# Patient Record
Sex: MALE | Race: WHITE | NOT HISPANIC OR LATINO | URBAN - METROPOLITAN AREA
[De-identification: names, ages, dates, MRNs, and addresses within clinical notes are randomized per-mention and may not be internally consistent; named-entity substitution may affect disease eponyms.]

---

## 2021-01-28 ENCOUNTER — OFFICE VISIT (OUTPATIENT)
Dept: ORTHOPEDIC SURGERY | Facility: CLINIC | Age: 61
End: 2021-01-28

## 2021-01-28 VITALS — BODY MASS INDEX: 18.88 KG/M2 | HEIGHT: 76 IN | WEIGHT: 155 LBS | TEMPERATURE: 98.6 F

## 2021-01-28 DIAGNOSIS — M75.101 TEAR OF RIGHT ROTATOR CUFF, UNSPECIFIED TEAR EXTENT, UNSPECIFIED WHETHER TRAUMATIC: Primary | ICD-10-CM

## 2021-01-28 DIAGNOSIS — R52 PAIN: ICD-10-CM

## 2021-01-28 PROCEDURE — 99204 OFFICE O/P NEW MOD 45 MIN: CPT | Performed by: ORTHOPAEDIC SURGERY

## 2021-01-28 PROCEDURE — 20610 DRAIN/INJ JOINT/BURSA W/O US: CPT | Performed by: ORTHOPAEDIC SURGERY

## 2021-01-28 PROCEDURE — 73030 X-RAY EXAM OF SHOULDER: CPT | Performed by: ORTHOPAEDIC SURGERY

## 2021-01-28 RX ORDER — BUPRENORPHINE HYDROCHLORIDE AND NALOXONE HYDROCHLORIDE DIHYDRATE 8; 2 MG/1; MG/1
1 TABLET SUBLINGUAL DAILY
COMMUNITY

## 2021-01-28 RX ORDER — ALBUTEROL SULFATE 2.5 MG/3ML
SOLUTION RESPIRATORY (INHALATION)
COMMUNITY
Start: 2020-12-10

## 2021-01-28 RX ORDER — TRAZODONE HYDROCHLORIDE 100 MG/1
TABLET ORAL
COMMUNITY
Start: 2021-01-13

## 2021-01-28 RX ORDER — TIOTROPIUM BROMIDE AND OLODATEROL 3.124; 2.736 UG/1; UG/1
SPRAY, METERED RESPIRATORY (INHALATION)
COMMUNITY
Start: 2021-01-13

## 2021-01-28 RX ADMIN — METHYLPREDNISOLONE ACETATE 80 MG: 80 INJECTION, SUSPENSION INTRA-ARTICULAR; INTRALESIONAL; INTRAMUSCULAR; SOFT TISSUE at 08:38

## 2021-02-08 RX ORDER — METHYLPREDNISOLONE ACETATE 80 MG/ML
80 INJECTION, SUSPENSION INTRA-ARTICULAR; INTRALESIONAL; INTRAMUSCULAR; SOFT TISSUE
Status: COMPLETED | OUTPATIENT
Start: 2021-01-28 | End: 2021-01-28

## 2021-02-26 ENCOUNTER — OFFICE VISIT (OUTPATIENT)
Dept: ORTHOPEDIC SURGERY | Facility: CLINIC | Age: 61
End: 2021-02-26

## 2021-02-26 VITALS — TEMPERATURE: 97.3 F | BODY MASS INDEX: 18.88 KG/M2 | WEIGHT: 155 LBS | HEIGHT: 76 IN

## 2021-02-26 DIAGNOSIS — M75.101 TEAR OF RIGHT ROTATOR CUFF, UNSPECIFIED TEAR EXTENT, UNSPECIFIED WHETHER TRAUMATIC: Primary | ICD-10-CM

## 2021-02-26 PROCEDURE — 99212 OFFICE O/P EST SF 10 MIN: CPT | Performed by: ORTHOPAEDIC SURGERY

## 2021-02-26 NOTE — PROGRESS NOTES
Shoulder Follow Up      Patient: Tien Jarquin        YOB: 1960            Chief Complaints: right Shoulder pain      History of Present Illness: Patient is here follow right shoulder pain I injected him last visit he states he got some relief about 50% but still has pain particularly in some positions in particular with overhead activity      Physical Exam: 60 y.o. male  General Appearance:    Alert, cooperative, in no acute distress                 There were no vitals filed for this visit.     Patient is alert and read ×3 no acute distress appears her above-listed at height weight and age.  Affect is normal respiratory rate is normal unlabored. Heart rate regular rate rhythm, sclera, dentition and hearing are normal for the purpose of this exam.      Ortho Exam    Physical exam of the right shoulder reveals no overlying skin changes no lymphedema no lymphadenopathy.  Patient has active flexion 150 with mild symptoms passively I can get him to 180 abduction is similar external rotation is to 50 and internal rotation to the upper lumbar spine with mild symptoms.  Patient has good rotator cuff strength 4+ over 5 with isometric strength testing with pain.  Patient has a positive impingement and a positive Cardoza sign.  Patient has good cervical range of motion which is full and asymptomatic no radicular symptoms.  Patient has a normal elbow exam.  Good distal pulses are present  Patient has pain with overhead activity and a positive Neer sign and a positive empty can sign , a positive drop arm and a definitive painful arc        Assessment/Plan:      Persistent right shoulder pain despite conservative measures I suspect he does have a rotator cuff tear which I did discuss with him plan is to proceed with an MRI to better assess this and he will follow-up after that test

## 2021-04-21 ENCOUNTER — HOSPITAL ENCOUNTER (OUTPATIENT)
Dept: MRI IMAGING | Facility: HOSPITAL | Age: 61
Discharge: HOME | End: 2021-04-21
Admitting: ORTHOPAEDIC SURGERY

## 2021-04-21 DIAGNOSIS — M75.101 TEAR OF RIGHT ROTATOR CUFF, UNSPECIFIED TEAR EXTENT, UNSPECIFIED WHETHER TRAUMATIC: ICD-10-CM

## 2021-04-21 PROCEDURE — 73221 MRI JOINT UPR EXTREM W/O DYE: CPT

## 2021-05-03 ENCOUNTER — OFFICE VISIT (OUTPATIENT)
Dept: ORTHOPEDIC SURGERY | Facility: CLINIC | Age: 61
End: 2021-05-03

## 2021-05-03 VITALS — HEIGHT: 76 IN | TEMPERATURE: 98 F | WEIGHT: 155 LBS | BODY MASS INDEX: 18.88 KG/M2

## 2021-05-03 DIAGNOSIS — M75.111 INCOMPLETE TEAR OF RIGHT ROTATOR CUFF, UNSPECIFIED WHETHER TRAUMATIC: Primary | ICD-10-CM

## 2021-05-03 PROCEDURE — 99213 OFFICE O/P EST LOW 20 MIN: CPT | Performed by: ORTHOPAEDIC SURGERY

## 2021-05-03 PROCEDURE — 20610 DRAIN/INJ JOINT/BURSA W/O US: CPT | Performed by: ORTHOPAEDIC SURGERY

## 2021-05-03 RX ADMIN — LIDOCAINE HYDROCHLORIDE 4 ML: 20 INJECTION, SOLUTION EPIDURAL; INFILTRATION; INTRACAUDAL; PERINEURAL at 15:56

## 2021-05-03 RX ADMIN — METHYLPREDNISOLONE ACETATE 80 MG: 80 INJECTION, SUSPENSION INTRA-ARTICULAR; INTRALESIONAL; INTRAMUSCULAR; SOFT TISSUE at 15:56

## 2021-05-03 NOTE — PROGRESS NOTES
Right Shoulder MRI Follow Up      Patient: Tien Jarquin        YOB: 1960            Chief Complaints: right Shoulder pain      History of Present Illness: The patient is here follow-up of an MRI of the shoulder MRI demonstrates a partial tear of the rotator cuff as well as a tear of the posterior superior labrum we did a subacromial injection he got temporary but minimal relief.  Talked him about another injection at this point time he is cannot considering surgical we will try glenohumeral injection and continue physical therapy      Physical Exam: 60 y.o. male  General Appearance:    Alert, cooperative, in no acute distress                 There were no vitals filed for this visit.     Patient is alert and read ×3 no acute distress appears her above-listed at height weight and age.  Affect is normal respiratory rate is normal unlabored. Heart rate regular rate rhythm, sclera, dentition and hearing are normal for the purpose of this exam.      Ortho Exam  Physical exam of the right shoulder reveals no overlying skin changes no lymphedema no lymphadenopathy.  Patient has active flexion 180 with mild symptoms abduction is similar external rotation is to 50 and internal rotation to the upper lumbar spine with mild symptoms.  Patient has good rotator cuff strength 4+ over 5 with isometric strength testing with pain.  Patient has a positive impingement and a positive Cardoza sign.  Patient has good cervical range of motion which is full and asymptomatic no radicular symptoms.  Patient has a normal elbow exam.  Good distal pulses are present  Patient has pain with overhead activity and a positive Neer sign and a positive empty can sign , a positive drop arm and a definitive painful arc    MRI Results: MRIs as above I reviewed the films myself and agree with the findings  Large Joint Arthrocentesis: R glenohumeral  Date/Time: 5/3/2021 3:56 PM  Consent given by: patient  Site marked: site  marked  Timeout: Immediately prior to procedure a time out was called to verify the correct patient, procedure, equipment, support staff and site/side marked as required   Supporting Documentation  Indications: pain   Procedure Details  Location: shoulder - R glenohumeral  Preparation: Patient was prepped and draped in the usual sterile fashion  Needle size: 22 G  Approach: posterior  Medications administered: 80 mg methylPREDNISolone acetate 80 MG/ML; 4 mL lidocaine PF 2% 2 %  Patient tolerance: patient tolerated the procedure well with no immediate complications            Assessment/Plan:    Right shoulder pain with partial rotator cuff tear some changes in the labrum I want to try glenohumeral injection and have him see physical therapy to work on range of motion and strengthening I will see him back in 4 weeks we did discuss possibility of surgical intervention in the future if these conservative measures fail we also discussed what that would entail as far as time off etc.

## 2021-05-04 RX ORDER — LIDOCAINE HYDROCHLORIDE 20 MG/ML
4 INJECTION, SOLUTION EPIDURAL; INFILTRATION; INTRACAUDAL; PERINEURAL
Status: COMPLETED | OUTPATIENT
Start: 2021-05-03 | End: 2021-05-03

## 2021-05-04 RX ORDER — METHYLPREDNISOLONE ACETATE 80 MG/ML
80 INJECTION, SUSPENSION INTRA-ARTICULAR; INTRALESIONAL; INTRAMUSCULAR; SOFT TISSUE
Status: COMPLETED | OUTPATIENT
Start: 2021-05-03 | End: 2021-05-03

## 2021-08-02 NOTE — PROGRESS NOTES
Patient: Tien Jarquin  YOB: 1960  Date of Service: 8/2/2021    Chief Complaints: Right shoulder pain  Subjective:    History of Present Illness: Pt is seen in the office today with complaints of right shoulder pain we did do an MRI which showed a partial rotator cuff tear as well is a tear of the labrum.  We tried a subacromial injection he did not get any relief I saw him.  In May we did a glenohumeral injection he got great relief.  We talked about options including surgical options he would really just like to do another injection he is trying to behave and not do a lot of overhead lifting        Allergies: No Known Allergies    Medications:   Home Medications:  Current Outpatient Medications on File Prior to Visit   Medication Sig   • albuterol (PROVENTIL) (2.5 MG/3ML) 0.083% nebulizer solution    • buprenorphine-naloxone (SUBOXONE) 8-2 MG per SL tablet Place 1 tablet under the tongue Daily.   • Stiolto Respimat 2.5-2.5 MCG/ACT aerosol solution inhaler    • traZODone (DESYREL) 100 MG tablet      No current facility-administered medications on file prior to visit.     Current Medications:  Scheduled Meds:  Continuous Infusions:No current facility-administered medications for this visit.    PRN Meds:.    I have reviewed the patient's medical history in detail and updated the computerized patient record.  Review and summarization of old records include:    Past Medical History:   Diagnosis Date   • Back problem    • Emphysema lung (CMS/HCC)    • Epilepsy (CMS/HCC)       No past surgical history on file.     Social History     Occupational History   • Not on file   Tobacco Use   • Smoking status: Current Every Day Smoker     Packs/day: 1.00     Years: 50.00     Pack years: 50.00     Types: Cigarettes   • Smokeless tobacco: Current User   Vaping Use   • Vaping Use: Never used   Substance and Sexual Activity   • Alcohol use: Yes     Alcohol/week: 4.0 standard drinks     Types: 4 Cans of beer per  week   • Drug use: Not on file   • Sexual activity: Not on file      Social History     Social History Narrative   • Not on file      No family history on file.    ROS: 14 point review of systems was performed and was negative except for documented findings in HPI and today's encounter.     Allergies: No Known Allergies  Constitutional:  Denies fever, shaking or chills   Eyes:  Denies change in visual acuity   HENT:  Denies nasal congestion or sore throat   Respiratory:  Denies cough or shortness of breath   Cardiovascular:  Denies chest pain or severe LE edema   GI:  Denies abdominal pain, nausea, vomiting, bloody stools or diarrhea   Musculoskeletal:  Numbness, tingling, or loss of motor function only as noted above in history of present illness.  : Denies painful urination or hematuria  Integument:  Denies rash, lesion or ulceration   Neurologic:  Denies headache or focal weakness  Endocrine:  Denies lymphadenopathy  Psych:  Denies confusion or change in mental status   Hem:  Denies active bleeding      Physical Exam: 61 y.o. male  Wt Readings from Last 3 Encounters:   05/03/21 70.3 kg (155 lb)   02/26/21 70.3 kg (155 lb)   01/28/21 70.3 kg (155 lb)       There is no height or weight on file to calculate BMI.  No height and weight on file for this encounter.  There were no vitals filed for this visit.  Vital signs reviewed.   General Appearance:    Alert, cooperative, in no acute distress                    Ortho exam      Exam is unchanged       .time    Assessment: Right shoulder pain with labral pathology and a partial rotator cuff tear he did great with a glenohumeral injection he would like to do that again today.  He understands the surgical options as long as he wants to continue conservative eating no surgery and he would like an injection he will see Duane in follow-up    Plan:   Follow up as indicated.  Ice, elevate, and rest as needed.  Discussed conservative measures of pain control including ice,  bracing.  Also talked about the importance of strengthening and maintaining ideal body weight    Lara Onofre M.D.    Large Joint Arthrocentesis: R glenohumeral  Date/Time: 8/5/2021 7:55 AM  Consent given by: patient  Site marked: site marked  Timeout: Immediately prior to procedure a time out was called to verify the correct patient, procedure, equipment, support staff and site/side marked as required   Supporting Documentation  Indications: pain   Procedure Details  Location: shoulder - R glenohumeral  Preparation: Patient was prepped and draped in the usual sterile fashion  Needle gauge: 21G.  Approach: anterior  Medications administered: 80 mg methylPREDNISolone acetate 80 MG/ML; 4 mL lidocaine PF 1% 1 %  Patient tolerance: patient tolerated the procedure well with no immediate complications

## 2021-08-05 ENCOUNTER — OFFICE VISIT (OUTPATIENT)
Dept: ORTHOPEDIC SURGERY | Facility: CLINIC | Age: 61
End: 2021-08-05

## 2021-08-05 VITALS — WEIGHT: 165 LBS | BODY MASS INDEX: 20.09 KG/M2 | HEIGHT: 76 IN | TEMPERATURE: 96.4 F

## 2021-08-05 DIAGNOSIS — S43.431D TEAR OF RIGHT GLENOID LABRUM, SUBSEQUENT ENCOUNTER: ICD-10-CM

## 2021-08-05 DIAGNOSIS — M75.111 INCOMPLETE TEAR OF RIGHT ROTATOR CUFF, UNSPECIFIED WHETHER TRAUMATIC: Primary | ICD-10-CM

## 2021-08-05 PROCEDURE — 20610 DRAIN/INJ JOINT/BURSA W/O US: CPT | Performed by: ORTHOPAEDIC SURGERY

## 2021-08-05 RX ORDER — LIDOCAINE HYDROCHLORIDE 10 MG/ML
4 INJECTION, SOLUTION EPIDURAL; INFILTRATION; INTRACAUDAL; PERINEURAL
Status: COMPLETED | OUTPATIENT
Start: 2021-08-05 | End: 2021-08-05

## 2021-08-05 RX ORDER — METHYLPREDNISOLONE ACETATE 80 MG/ML
80 INJECTION, SUSPENSION INTRA-ARTICULAR; INTRALESIONAL; INTRAMUSCULAR; SOFT TISSUE
Status: COMPLETED | OUTPATIENT
Start: 2021-08-05 | End: 2021-08-05

## 2021-08-05 RX ADMIN — LIDOCAINE HYDROCHLORIDE 4 ML: 10 INJECTION, SOLUTION EPIDURAL; INFILTRATION; INTRACAUDAL; PERINEURAL at 07:55

## 2021-08-05 RX ADMIN — METHYLPREDNISOLONE ACETATE 80 MG: 80 INJECTION, SUSPENSION INTRA-ARTICULAR; INTRALESIONAL; INTRAMUSCULAR; SOFT TISSUE at 07:55

## 2022-01-18 ENCOUNTER — TELEPHONE (OUTPATIENT)
Dept: ORTHOPEDIC SURGERY | Facility: CLINIC | Age: 62
End: 2022-01-18

## 2022-01-18 NOTE — TELEPHONE ENCOUNTER
Caller: MARQUISE FITCH    Relationship to patient: SELF    Best call back number:  [phone]    Chief complaint: PATIENT NEEDS APPT. FOR F/U RIGHT SHOULDER CORTISONE INJECTION. PATIENT SAYS DIMITRY TOLD HIM THAT HER NURSE COULD DO IT. PATIENT WOULD LIKE THE [place] OFFICE.    Type of visit: INJECTION    Requested date: ASAP

## 2022-01-26 NOTE — PROGRESS NOTES
[facility]  Follow Up Visit      Patient Name: Tien Jarquin  : 1960  Primary Care Physician: Provider, No Known        Chief Complaint: Right shoulder pain    HPI:   Tien Jarquin is a 61 y.o. year old who presents today for right shoulder pain.  Patient has a history of chronic right shoulder pain, he has been evaluated and treated by Dr. Onofre in the past.  He has an incomplete tear of the right rotator cuff as well as tear of the labrum.  He knows his surgical options and is not interested in any surgery at this time.  He has received intermittent injections with good relief in his symptoms.  He still able to do all the things he likes doing including golfing, fishing and hunting.  He says the shoulder did pretty well last time, he has not been in for an injection in close to 6 months.  He wishes to proceed with an additional injection today and has new x-rays for further evaluation and treatment.    ROS:  ROS negative except as listed in the HPI.     Allergies: No Known Allergies    Medications:  Current Outpatient Medications on File Prior to Visit   Medication Sig   • albuterol (PROVENTIL) (2.5 MG/3ML) 0.083% nebulizer solution    • buprenorphine-naloxone (SUBOXONE) 8-2 MG per SL tablet Place 1 tablet under the tongue Daily.   • Stiolto Respimat 2.5-2.5 MCG/ACT aerosol solution inhaler    • traZODone (DESYREL) 100 MG tablet      No current facility-administered medications on file prior to visit.       Physical Exam:   61 y.o. male  Body mass index is 20.08 kg/m²., 74.8 kg (165 lb)  Vitals:    22 0921   Temp: 97 °F (36.1 °C)     General: Alert, cooperative, appears well and in no observable distress. Appears stated age and BMI as listed above.  Respiratory: Normal respiratory effort.  Skin: Warm & well perfused; appropriate skin turgor.  Psych: Appropriate mood & affect.    MSK Exam:  Physical exam of the right shoulder reveals no deformity or wounds, no significant swelling  redness or effusion.     Radiology:    The following X-rays were ordered/reviewed today to evaluate the patient's symptoms: Shoulder: AP, Scapular Y and Axillary Lateral of right shoulder(s) show Moderate AC joint degenerative changes.  Very questionable high riding humeral head but overall still well-seated in the glenoid.  He maintains adequate glenohumeral space.  Overall I do not appreciate any significant progression in his degenerative changes when compared with images from 1 year ago..    Procedure:   See Procedure Note: The potential risks and benefits of performing a diagnostic and therapeutic injection were discussed with the patient prior to procedure. Risks include, but are not limited to infection, swelling, transient increase in pain, bleeding, bruising. Patient was advised that injections are a diagnostic and therapeutic tool meaning they may not alleviate symptoms at all, or may only provide partial or temporary relief.       Misc. Data/Labs: N/A    Assessment & Plan:    This is a 61-year-old male with chronic right shoulder pain.  He has an MRI which demonstrates partial rotator cuff tear as well as a labral tear.  Is not interested in anything surgical at this time.  Plan is to proceed with a glenohumeral injection today which she tolerated well.  I will see him back as needed, he could certainly come in soon as 3 months but we talked about spacing that out is much as possible, if he is not in pain I would hold off on unnecessary injections.  Continue activities as tolerated.    Injection provided in the office today. Tolerated well with no immediate complications. Injection precautions discussed with the patient., Follow up PRN. and Patient encouraged to call with questions or concerns prior to follow up.       ICD-10-CM ICD-9-CM   1. Tear of right glenoid labrum, subsequent encounter  S43.431D V58.89     840.8   2. Incomplete tear of right rotator cuff, unspecified whether traumatic  M75.111  840.4     No orders of the defined types were placed in this encounter.    Orders Placed This Encounter   Procedures   • Large Joint Arthrocentesis: R glenohumeral     Return in about 3 months (around 4/27/2022), or if symptoms worsen or fail to improve.    NATHANIEL Wayne  Large Joint Arthrocentesis: R glenohumeral  Date/Time: 1/27/2022 9:24 AM  Consent given by: patient  Site marked: site marked  Timeout: Immediately prior to procedure a time out was called to verify the correct patient, procedure, equipment, support staff and site/side marked as required   Supporting Documentation  Indications: pain   Procedure Details  Location: shoulder - R glenohumeral  Preparation: Patient was prepped and draped in the usual sterile fashion  Needle gauge: 21G.  Approach: posterior  Medications administered: 4 mL lidocaine (cardiac); 40 mg triamcinolone acetonide 40 MG/ML  Patient tolerance: patient tolerated the procedure well with no immediate complications            Dictated Utilizing Dragon Dictation

## 2022-01-27 ENCOUNTER — CLINICAL SUPPORT (OUTPATIENT)
Dept: ORTHOPEDIC SURGERY | Facility: CLINIC | Age: 62
End: 2022-01-27

## 2022-01-27 VITALS — WEIGHT: 165 LBS | BODY MASS INDEX: 20.09 KG/M2 | HEIGHT: 76 IN | TEMPERATURE: 97 F

## 2022-01-27 DIAGNOSIS — S43.431D TEAR OF RIGHT GLENOID LABRUM, SUBSEQUENT ENCOUNTER: Primary | ICD-10-CM

## 2022-01-27 DIAGNOSIS — M75.111 INCOMPLETE TEAR OF RIGHT ROTATOR CUFF, UNSPECIFIED WHETHER TRAUMATIC: ICD-10-CM

## 2022-01-27 PROCEDURE — 73030 X-RAY EXAM OF SHOULDER: CPT | Performed by: NURSE PRACTITIONER

## 2022-01-27 PROCEDURE — 20610 DRAIN/INJ JOINT/BURSA W/O US: CPT | Performed by: NURSE PRACTITIONER

## 2022-01-27 RX ORDER — TRIAMCINOLONE ACETONIDE 40 MG/ML
40 INJECTION, SUSPENSION INTRA-ARTICULAR; INTRAMUSCULAR
Status: COMPLETED | OUTPATIENT
Start: 2022-01-27 | End: 2022-01-27

## 2022-01-27 RX ADMIN — TRIAMCINOLONE ACETONIDE 40 MG: 40 INJECTION, SUSPENSION INTRA-ARTICULAR; INTRAMUSCULAR at 09:24

## 2022-07-27 ENCOUNTER — TELEPHONE (OUTPATIENT)
Dept: ORTHOPEDIC SURGERY | Facility: CLINIC | Age: 62
End: 2022-07-27

## 2022-07-27 NOTE — TELEPHONE ENCOUNTER
Caller: PATIENT    Relationship to patient: SELF     Best call back number: [phone]    Chief complaint: RIGHT SHOULDER PAIN    Type of visit: CORTISONE INJECTION     Requested date:     Additional notes: PT. WANTS TO SCHEDULE CORTISONE INJECTION FOR RIGHT SHOULDER.

## 2022-08-03 NOTE — PROGRESS NOTES
[facility]  Follow Up Visit      Patient Name: Tien Jarquin  : 1960  Primary Care Physician: Provider, No Known        Chief Complaint: Right shoulder pain    HPI:   Tien Jarquin is a 62 y.o. year old who presents today for right shoulder pain.  Patient has a history of chronic right shoulder pain and has been evaluated the past by Dr. 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  He has been explained to surgical options in the past and is not interested in surgery.  He does well with conservative measures, we last saw him approximately 6 months ago.  He is noticed recently his pain started to return.    ROS:  ROS negative except as listed in the HPI.    Allergies: No Known Allergies    Medications:  Current Outpatient Medications on File Prior to Visit   Medication Sig   • albuterol (PROVENTIL) (2.5 MG/3ML) 0.083% nebulizer solution    • buprenorphine-naloxone (SUBOXONE) 8-2 MG per SL tablet Place 1 tablet under the tongue Daily.   • magnesium oxide (MAG-OX) 400 MG tablet Take 400 mg by mouth Daily.   • Stiolto Respimat 2.5-2.5 MCG/ACT aerosol solution inhaler    • traZODone (DESYREL) 100 MG tablet      No current facility-administered medications on file prior to visit.       Physical Exam:   62 y.o. male  Body mass index is 20.71 kg/m²., 77.2 kg (170 lb 1.6 oz)  Vitals:    22 1501   Temp: 98.6 °F (37 °C)     General: Alert, cooperative, appears well and in no observable distress. Appears stated age and BMI as listed above.  Respiratory: Normal respiratory effort.  Skin: Warm & well perfused; appropriate skin turgor.  Psych: Appropriate mood & affect.    MSK Exam:  Exam is grossly unchanged, no deformities or wounds, no redness swelling or warmth.     Radiology:    No new images were needed at the visit today.     Procedure:   See Procedure Note: The potential risks and benefits of performing a diagnostic and therapeutic injection were  discussed with the patient prior to procedure. Risks include, but are not limited to infection, swelling, transient increase in pain, bleeding, bruising. Patient was advised that injections are a diagnostic and therapeutic tool meaning they may not alleviate symptoms at all, or may only provide partial or temporary relief. Injection precautions and aftercare discussed.    Large Joint Arthrocentesis: R glenohumeral  Date/Time: 8/4/2022 3:56 PM  Consent given by: patient  Site marked: site marked  Timeout: Immediately prior to procedure a time out was called to verify the correct patient, procedure, equipment, support staff and site/side marked as required   Supporting Documentation  Indications: pain   Procedure Details  Location: shoulder - R glenohumeral  Preparation: Patient was prepped and draped in the usual sterile fashion  Needle gauge: 21G.  Approach: posterior  Medications administered: 80 mg methylPREDNISolone acetate 80 MG/ML; 4 mL lidocaine PF 1% 1 %  Patient tolerance: patient tolerated the procedure well with no immediate complications          Misc. Data/Labs: N/A    Assessment & Plan:    This is a 62-year-old male with chronic right shoulder pain, rotator cuff pathology as well as labral pathology.  He gets intermittent injections with good relief in his symptoms is not interested in anything surgical.  He knows how to modify his activities.  Plan is to proceed with injection we will see him back as needed he knows to call with any questions or concerns prior to follow-up.          ICD-10-CM ICD-9-CM   1. Incomplete tear of right rotator cuff, unspecified whether traumatic  M75.111 840.4   2. Tear of right glenoid labrum, subsequent encounter  S43.431D V58.89     840.8   3. Chronic right shoulder pain  M25.511 719.41    G89.29 338.29     No orders of the defined types were placed in this encounter.    No orders of the defined types were placed in this encounter.    Return if symptoms worsen or fail to  improve.    Patient encouraged to call with questions or concerns prior to follow up.  Recommend ICE and/or HEAT PRN as discussed.  Will discuss with attending physician as needed.  Consider additional referrals, work up and/or advanced imaging as indicated or if patient fails to respond to conservative care.        Duane Sheppard, NATHANIEL      Dictated Utilizing Dragon Dictation

## 2022-08-04 ENCOUNTER — CLINICAL SUPPORT (OUTPATIENT)
Dept: ORTHOPEDIC SURGERY | Facility: CLINIC | Age: 62
End: 2022-08-04

## 2022-08-04 VITALS — WEIGHT: 170.1 LBS | BODY MASS INDEX: 20.71 KG/M2 | TEMPERATURE: 98.6 F | HEIGHT: 76 IN

## 2022-08-04 DIAGNOSIS — M75.111 INCOMPLETE TEAR OF RIGHT ROTATOR CUFF, UNSPECIFIED WHETHER TRAUMATIC: ICD-10-CM

## 2022-08-04 DIAGNOSIS — S43.431D TEAR OF RIGHT GLENOID LABRUM, SUBSEQUENT ENCOUNTER: Primary | ICD-10-CM

## 2022-08-04 DIAGNOSIS — G89.29 CHRONIC RIGHT SHOULDER PAIN: ICD-10-CM

## 2022-08-04 DIAGNOSIS — M25.511 CHRONIC RIGHT SHOULDER PAIN: ICD-10-CM

## 2022-08-04 PROCEDURE — 20610 DRAIN/INJ JOINT/BURSA W/O US: CPT | Performed by: NURSE PRACTITIONER

## 2022-08-04 RX ORDER — LIDOCAINE HYDROCHLORIDE 10 MG/ML
4 INJECTION, SOLUTION EPIDURAL; INFILTRATION; INTRACAUDAL; PERINEURAL
Status: COMPLETED | OUTPATIENT
Start: 2022-08-04 | End: 2022-08-04

## 2022-08-04 RX ORDER — METHYLPREDNISOLONE ACETATE 80 MG/ML
80 INJECTION, SUSPENSION INTRA-ARTICULAR; INTRALESIONAL; INTRAMUSCULAR; SOFT TISSUE
Status: COMPLETED | OUTPATIENT
Start: 2022-08-04 | End: 2022-08-04

## 2022-08-04 RX ORDER — MAGNESIUM OXIDE 400 MG/1
400 TABLET ORAL DAILY
COMMUNITY

## 2022-08-04 RX ADMIN — LIDOCAINE HYDROCHLORIDE 4 ML: 10 INJECTION, SOLUTION EPIDURAL; INFILTRATION; INTRACAUDAL; PERINEURAL at 15:56

## 2022-08-04 RX ADMIN — METHYLPREDNISOLONE ACETATE 80 MG: 80 INJECTION, SUSPENSION INTRA-ARTICULAR; INTRALESIONAL; INTRAMUSCULAR; SOFT TISSUE at 15:56

## 2023-04-27 ENCOUNTER — TELEPHONE (OUTPATIENT)
Dept: ORTHOPEDIC SURGERY | Facility: CLINIC | Age: 63
End: 2023-04-27

## 2023-04-27 NOTE — TELEPHONE ENCOUNTER
Caller: NATHANIEL MARIEE    Relationship to patient: PATIENT     Best call back number: [phone]    Chief complaint: RIGHT SHOULDER     Type of visit: INJECTION     Requested date: ASAP     If rescheduling, when is the original appointment: N/A

## 2023-04-28 ENCOUNTER — CLINICAL SUPPORT (OUTPATIENT)
Dept: ORTHOPEDIC SURGERY | Facility: CLINIC | Age: 63
End: 2023-04-28
Payer: MEDICAID

## 2023-04-28 VITALS — HEIGHT: 76 IN | BODY MASS INDEX: 20.58 KG/M2 | WEIGHT: 169 LBS | TEMPERATURE: 97.6 F

## 2023-04-28 DIAGNOSIS — S43.431D TEAR OF RIGHT GLENOID LABRUM, SUBSEQUENT ENCOUNTER: ICD-10-CM

## 2023-04-28 DIAGNOSIS — G89.29 CHRONIC RIGHT SHOULDER PAIN: Primary | ICD-10-CM

## 2023-04-28 DIAGNOSIS — M75.111 INCOMPLETE TEAR OF RIGHT ROTATOR CUFF, UNSPECIFIED WHETHER TRAUMATIC: ICD-10-CM

## 2023-04-28 DIAGNOSIS — M25.511 CHRONIC RIGHT SHOULDER PAIN: Primary | ICD-10-CM

## 2023-04-28 RX ORDER — LIDOCAINE HYDROCHLORIDE 10 MG/ML
2 INJECTION, SOLUTION EPIDURAL; INFILTRATION; INTRACAUDAL; PERINEURAL
Status: COMPLETED | OUTPATIENT
Start: 2023-04-28 | End: 2023-04-28

## 2023-04-28 RX ORDER — METHYLPREDNISOLONE ACETATE 80 MG/ML
80 INJECTION, SUSPENSION INTRA-ARTICULAR; INTRALESIONAL; INTRAMUSCULAR; SOFT TISSUE
Status: COMPLETED | OUTPATIENT
Start: 2023-04-28 | End: 2023-04-28

## 2023-04-28 RX ORDER — ALBUTEROL SULFATE 90 UG/1
AEROSOL, METERED RESPIRATORY (INHALATION)
COMMUNITY
Start: 2023-01-30

## 2023-04-28 RX ADMIN — METHYLPREDNISOLONE ACETATE 80 MG: 80 INJECTION, SUSPENSION INTRA-ARTICULAR; INTRALESIONAL; INTRAMUSCULAR; SOFT TISSUE at 09:16

## 2023-04-28 RX ADMIN — LIDOCAINE HYDROCHLORIDE 2 ML: 10 INJECTION, SOLUTION EPIDURAL; INFILTRATION; INTRACAUDAL; PERINEURAL at 09:16

## 2023-04-28 NOTE — PROGRESS NOTES
[facility]  Follow Up Visit      Patient Name: Tien Jarquin  : 1960  Primary Care Physician: Provider, No Known        Chief Complaint: Right shoulder pain     HPI:   Tien Jarqiun is a 62 y.o. year old who presents today for right shoulder pain. Patient has a history of chronic right shoulder pain and has been evaluated the past by Dr. 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  He has been explained to surgical options in the past and is not interested in surgery.  He does well with conservative measures, we last saw him approximately 8 months ago.  He is noticed recently his pain started to return. He recently retired which has helped. He has learned to modify his activities as well. He is here today with new x-rays for further evaluation and treatment.     ROS:  ROS negative except as listed in the HPI.    Allergies: No Known Allergies    Medications:  Current Outpatient Medications on File Prior to Visit   Medication Sig   • albuterol (PROVENTIL) (2.5 MG/3ML) 0.083% nebulizer solution    • buprenorphine-naloxone (SUBOXONE) 8-2 MG per SL tablet Place 1 tablet under the tongue Daily.   • magnesium oxide (MAG-OX) 400 MG tablet Take 1 tablet by mouth Daily.   • Stiolto Respimat 2.5-2.5 MCG/ACT aerosol solution inhaler    • Ventolin  (90 Base) MCG/ACT inhaler    • traZODone (DESYREL) 100 MG tablet  (Patient not taking: Reported on 2023)     No current facility-administered medications on file prior to visit.       Physical Exam:   62 y.o. male  Body mass index is 20.57 kg/m²., 76.7 kg (169 lb)  Vitals:    23 0903   Temp: 97.6 °F (36.4 °C)     General: Alert, cooperative, appears well and in no observable distress. Appears stated age and BMI as listed above.  Respiratory: Normal respiratory effort.  Skin: Warm & well perfused; appropriate skin turgor.  Psych: Appropriate mood & affect.    MSK Exam:  Exam is unchanged      Radiology:    The following X-rays were ordered/reviewed today to evaluate the patient's symptoms: Shoulder: AP, Scapular Y and Axillary Lateral of right shoulder(s) show subtle increase in AC joint degenerative changes really no other acute bony pathology and no significant progression noted from prior films 1/2022.    Procedure:   See Procedure Note: The potential risks and benefits of performing a diagnostic and therapeutic injection were discussed with the patient prior to procedure. Risks include, but are not limited to infection, swelling, transient increase in pain, bleeding, bruising. Patient was advised that injections are a diagnostic and therapeutic tool meaning they may not alleviate symptoms at all, or may only provide partial or temporary relief. Injection precautions and aftercare discussed.    [facility] [place]. Data/Labs: N/A    Assessment & Plan:    ICD-10-CM ICD-9-CM   1. Chronic right shoulder pain  M25.511 719.41    G89.29 338.29   2. Incomplete tear of right rotator cuff, unspecified whether traumatic  M75.111 840.4   3. Tear of right glenoid labrum, subsequent encounter  S43.431D V58.89     840.8     No orders of the defined types were placed in this encounter.    Orders Placed This Encounter   Procedures   • Large Joint Arthrocentesis: R glenohumeral   • XR Shoulder 2+ View Right       This is a 61 y/o male with chronic R shoulder pain. He has known rotator cuff and labral pathology but gets good relief with symptoms. He seems to be doing better since retiring and is modifying activities. I think another GH injection is reasonable. We will see him back as needed.    Large Joint Arthrocentesis: R glenohumeral  Date/Time: 4/28/2023 9:16 AM  Consent given by: patient  Site marked: site marked  Timeout: Immediately prior to procedure a time out was called to verify the correct patient, procedure, equipment, support staff and site/side marked as required   Supporting Documentation  Indications: pain    Procedure Details  Location: shoulder - R glenohumeral  Preparation: Patient was prepped and draped in the usual sterile fashion  Needle gauge: 21G.  Approach: posterior  Medications administered: 80 mg methylPREDNISolone acetate 80 MG/ML; 2 mL lidocaine PF 1% 1 %  Patient tolerance: patient tolerated the procedure well with no immediate complications          Return if symptoms worsen or fail to improve.    Patient encouraged to call with questions or concerns prior to follow up.  Recommend ICE and/or HEAT PRN as discussed.  Will discuss with attending physician as needed.  Consider additional referrals, work up and/or advanced imaging as indicated or if patient fails to respond to conservative care.        Duane Sheppard, APRN

## 2025-02-11 ENCOUNTER — OFFICE VISIT (OUTPATIENT)
Dept: ORTHOPEDIC SURGERY | Facility: CLINIC | Age: 65
End: 2025-02-11
Payer: MEDICARE

## 2025-02-11 VITALS — BODY MASS INDEX: 19.86 KG/M2 | HEIGHT: 76 IN | TEMPERATURE: 97.8 F | WEIGHT: 163.1 LBS

## 2025-02-11 DIAGNOSIS — M25.512 ACUTE PAIN OF LEFT SHOULDER: Primary | ICD-10-CM

## 2025-02-11 DIAGNOSIS — M19.012 GLENOHUMERAL ARTHRITIS, LEFT: ICD-10-CM

## 2025-02-11 RX ORDER — METHYLPREDNISOLONE ACETATE 80 MG/ML
80 INJECTION, SUSPENSION INTRA-ARTICULAR; INTRALESIONAL; INTRAMUSCULAR; SOFT TISSUE
Status: COMPLETED | OUTPATIENT
Start: 2025-02-11 | End: 2025-02-11

## 2025-02-11 RX ORDER — ACETAMINOPHEN 500 MG
500 TABLET ORAL EVERY 6 HOURS PRN
COMMUNITY

## 2025-02-11 RX ORDER — LIDOCAINE HYDROCHLORIDE 10 MG/ML
2 INJECTION, SOLUTION EPIDURAL; INFILTRATION; INTRACAUDAL; PERINEURAL
Status: COMPLETED | OUTPATIENT
Start: 2025-02-11 | End: 2025-02-11

## 2025-02-11 RX ADMIN — LIDOCAINE HYDROCHLORIDE 2 ML: 10 INJECTION, SOLUTION EPIDURAL; INFILTRATION; INTRACAUDAL; PERINEURAL at 07:43

## 2025-02-11 RX ADMIN — METHYLPREDNISOLONE ACETATE 80 MG: 80 INJECTION, SUSPENSION INTRA-ARTICULAR; INTRALESIONAL; INTRAMUSCULAR; SOFT TISSUE at 07:43

## 2025-02-11 NOTE — PROGRESS NOTES
[facility]              New Problem      Patient Name: Tien Jarquin  : 1960  Primary Care Physician: Mary Daley APRN        Chief Complaint:  Left shoulder pain    HPI:   Tien Jarquin is a 64 y.o. year old who presents today for evaluation. Patient reports onset of left shoulder pain about a month ago. No event or injury. He states that the shoulder feels a lot like his right one. We have seen him intermittently for that over the years. He has a known partial cuff tear and degenerative labral tearing which improved with a series of injections and conservative care. Last saw him nearly 2 years ago.    He reports the pain is worse with certain motions, lifting. Feels deep within the shoulder joint and some mild pain laterally. No radiation, no numbness or tingling. He has been enjoying golf and this does not seem to bother him too much.   History of Present Illness      Past Medical/Surgical, Social and Family History:  I have reviewed and/or updated pertinent history as noted in the medical record including:  Past Medical History:   Diagnosis Date    Back problem     Emphysema lung     Epilepsy      No past surgical history on file.  Social History     Occupational History    Not on file   Tobacco Use    Smoking status: Every Day     Current packs/day: 1.00     Average packs/day: 1 pack/day for 50.0 years (50.0 ttl pk-yrs)     Types: Cigarettes    Smokeless tobacco: Current   Vaping Use    Vaping status: Never Used   Substance and Sexual Activity    Alcohol use: Yes     Alcohol/week: 4.0 standard drinks of alcohol     Types: 4 Cans of beer per week    Drug use: Not on file    Sexual activity: Defer          Allergies: No Known Allergies    Medications:   Home Medications:  Current Outpatient Medications on File Prior to Visit   Medication Sig    albuterol (PROVENTIL) (2.5 MG/3ML) 0.083% nebulizer solution     buprenorphine-naloxone (SUBOXONE) 8-2 MG per SL tablet Place 1 tablet  under the tongue Daily.    Stiolto Respimat 2.5-2.5 MCG/ACT aerosol solution inhaler     Ventolin  (90 Base) MCG/ACT inhaler     acetaminophen (TYLENOL) 500 MG tablet Take 1 tablet by mouth Every 6 (Six) Hours As Needed.    magnesium oxide (MAG-OX) 400 MG tablet Take 1 tablet by mouth Daily. (Patient not taking: Reported on 2/11/2025)    traZODone (DESYREL) 100 MG tablet  (Patient not taking: Reported on 2/11/2025)     No current facility-administered medications on file prior to visit.         ROS:  ROS negative except as listed in the HPI.    Physical Exam:   64 y.o. male  Body mass index is 19.85 kg/m²., 74 kg (163 lb 1.6 oz)  Vitals:    02/11/25 0852   Temp: 97.8 °F (36.6 °C)     General: Alert, cooperative, appears well and in no observable distress. Appears stated age and BMI as listed above.  HEENT: Normocephalic, atraumatic on external visual inspection.  CV: No significant peripheral edema.  Respiratory: Normal respiratory effort.  Skin: Warm & well perfused; appropriate skin turgor.  Psych: Appropriate mood & affect.  Neuro: Gross sensation and motor intact in affected extremity/extremities.  Vascular: Peripheral pulses palpable in affected extremity/extremities.   Physical Exam      MSK Exam:  Shoulder Musculoskeletal Exam    Inspection    Right      Right shoulder inspection is normal.    Left      Left shoulder inspection is normal.    Palpation    Right      Right shoulder palpation is normal.    Left      Left shoulder palpation is normal.      Crepitus: mild      Tenderness: present        Posterior shoulder: moderate    Range of Motion    Right      Right shoulder range of motion is normal.     Left      Active ROM: pain.       Passive ROM: pain.       Active forward elevation: 170.       Passive forward elevation: 170.       Active external rotation at side: 40.       Passive external rotation at side: 40.       Internal rotation: lumbar.     Strength    Right      Right shoulder strength is  normal.     Left      External rotation: 5/5. External rotation is not affected by pain.       Internal rotation: 5/5. Internal rotation is not affected by pain.       Abduction: 4+/5. Abduction is affected by pain.     Neurovascular    Left      Left shoulder nerve sensation is normal.    Special Tests    Left     Rotator Cuff Signs      Supraspinatus: negative       Painful arc test: negative     Special tests additional comments: Pain at terminal motion    No obvious pain with brief exam of cervical spine.     Radiology:    Results      The following X-rays were ordered/reviewed today to evaluate the patient's symptoms: Shoulder: AP, Scapular Y and Axillary Lateral of left shoulder(s) show degenerative changes of the glenohumeral joint, moderate but with reasonable remaining joint space. He has some moderate AC joint changes. Otherwise, no obvious acute or chronic bony pathology. There are no prior films available for direct comparison.    Procedure:   See Procedure Note: The potential risks and benefits of performing a diagnostic and therapeutic injection were discussed with the patient prior to procedure. Risks include, but are not limited to infection, swelling, transient increase in pain, bleeding, bruising. Patient was advised that injections are a diagnostic and therapeutic tool meaning they may not alleviate symptoms at all, or may only provide partial or temporary relief. Injection precautions and aftercare discussed.      [facility] [place]. Data/Labs: N/A    Assessment & Plan:      ICD-10-CM ICD-9-CM   1. Acute pain of left shoulder  M25.512 719.41   2. Glenohumeral arthritis, left  M19.012 716.91     No orders of the defined types were placed in this encounter.    Orders Placed This Encounter   Procedures    Large Joint Arthrocentesis: L glenohumeral    XR Shoulder 2+ View Left     I suspect his pain is degenerative in nature. He would like an injection and I think that is a reasonable diagnostic and therapeutic  tool. It has certainly helped his right shoulder. Plan is to proceed with the injection and he will reach out if he does not get relief in symptoms.   Assessment & Plan      Continue with activity modifications as needed/discussed.  Continue ICE and/or HEAT PRN.  Recommend to continue activity as tolerated, focus on stretching and strengthening of the joint.    Focus on posture and body mechanics to improve/prevent symptoms.   Patient encouraged to call with questions or concerns prior to follow up.  Will discuss with attending as needed.  Consider additional referrals, work up and/or advanced imaging as indicated or if patient fails to respond to conservative care.    Return if symptoms worsen or fail to improve.         NATHANIEL Wayne    Dictation software was used to complete a portion or all of this note.      Large Joint Arthrocentesis: L glenohumeral  Date/Time: 2/11/2025 7:43 AM  Consent given by: patient  Site marked: site marked  Timeout: Immediately prior to procedure a time out was called to verify the correct patient, procedure, equipment, support staff and site/side marked as required   Supporting Documentation  Indications: pain   Procedure Details  Location: shoulder - L glenohumeral  Preparation: Patient was prepped and draped in the usual sterile fashion  Needle gauge: 21G.  Approach: posterior  Medications administered: 80 mg methylPREDNISolone acetate 80 MG/ML; 2 mL lidocaine PF 1% 1 %  Patient tolerance: patient tolerated the procedure well with no immediate complications

## 2025-05-16 ENCOUNTER — OFFICE VISIT (OUTPATIENT)
Dept: ORTHOPEDIC SURGERY | Facility: CLINIC | Age: 65
End: 2025-05-16
Payer: MEDICARE

## 2025-05-16 VITALS — HEIGHT: 76 IN | WEIGHT: 157.8 LBS | TEMPERATURE: 98.2 F | BODY MASS INDEX: 19.22 KG/M2

## 2025-05-16 DIAGNOSIS — M25.512 CHRONIC LEFT SHOULDER PAIN: Primary | ICD-10-CM

## 2025-05-16 DIAGNOSIS — G89.29 CHRONIC LEFT SHOULDER PAIN: Primary | ICD-10-CM

## 2025-05-16 DIAGNOSIS — M19.012 GLENOHUMERAL ARTHRITIS, LEFT: ICD-10-CM

## 2025-05-16 RX ORDER — LIDOCAINE HYDROCHLORIDE 10 MG/ML
2 INJECTION, SOLUTION EPIDURAL; INFILTRATION; INTRACAUDAL; PERINEURAL
Status: COMPLETED | OUTPATIENT
Start: 2025-05-16 | End: 2025-05-16

## 2025-05-16 RX ORDER — FLUTICASONE FUROATE, UMECLIDINIUM BROMIDE AND VILANTEROL TRIFENATATE 100; 62.5; 25 UG/1; UG/1; UG/1
POWDER RESPIRATORY (INHALATION)
COMMUNITY
Start: 2025-04-16

## 2025-05-16 RX ORDER — NICOTINE 21-14-7MG
1 KIT TRANSDERMAL DAILY
COMMUNITY
Start: 2025-03-14

## 2025-05-16 RX ORDER — METHYLPREDNISOLONE ACETATE 40 MG/ML
80 INJECTION, SUSPENSION INTRA-ARTICULAR; INTRALESIONAL; INTRAMUSCULAR; SOFT TISSUE
Status: COMPLETED | OUTPATIENT
Start: 2025-05-16 | End: 2025-05-16

## 2025-05-16 RX ADMIN — METHYLPREDNISOLONE ACETATE 80 MG: 40 INJECTION, SUSPENSION INTRA-ARTICULAR; INTRALESIONAL; INTRAMUSCULAR; SOFT TISSUE at 15:29

## 2025-05-16 RX ADMIN — LIDOCAINE HYDROCHLORIDE 2 ML: 10 INJECTION, SOLUTION EPIDURAL; INFILTRATION; INTRACAUDAL; PERINEURAL at 15:29

## 2025-05-16 NOTE — PROGRESS NOTES
[facility]              Follow Up      Patient Name: Tien Jarquin  : 1960  Primary Care Physician: Mary Daley APRN        Chief Complaint:  Left shoulder pain    HPI:   Tien Jarquin is a 64 y.o. year old who presents today for evaluation.  I last saw him approximately 3 months ago he was reporting newer left shoulder pain.  We have been treating his right shoulder conservatively that improved after a series of injections.  Currently the right shoulder still doing very well.  He says he got good relief with the left shoulder injection really felt like this was more degenerative pain and we did a glenohumeral injection.  He says he got good relief and would like to try another injection again.  He says his pain came back about 2 weeks ago.  Worse with overhead lifting and motion.  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  He really enjoys golf and wants to continue doing that.  History of Present Illness        Past Medical/Surgical, Social and Family History:  I have reviewed and/or updated pertinent history as noted in the medical record including:  Past Medical History:   Diagnosis Date    Back problem     Emphysema lung     Epilepsy      History reviewed. No pertinent surgical history.  Social History     Occupational History    Not on file   Tobacco Use    Smoking status: Every Day     Current packs/day: 1.00     Average packs/day: 1 pack/day for 50.0 years (50.0 ttl pk-yrs)     Types: Cigarettes    Smokeless tobacco: Current   Vaping Use    Vaping status: Never Used   Substance and Sexual Activity    Alcohol use: Yes     Alcohol/week: 4.0 standard drinks of alcohol     Types: 4 Cans of beer per week    Drug use: Not on file    Sexual activity: Defer          Allergies: No Known Allergies    Medications:   Home Medications:  Current Outpatient Medications on File Prior to Visit   Medication Sig    acetaminophen (TYLENOL) 500 MG tablet  Take 1 tablet by mouth Every 6 (Six) Hours As Needed.    albuterol (PROVENTIL) (2.5 MG/3ML) 0.083% nebulizer solution     buprenorphine-naloxone (SUBOXONE) 8-2 MG per SL tablet Place 1 tablet under the tongue Daily.    Nicotine 21-14-7 MG/24HR kit Place 1 patch on the skin as directed by provider Daily.    Stiolto Respimat 2.5-2.5 MCG/ACT aerosol solution inhaler     Trelegy Ellipta 100-62.5-25 MCG/ACT inhaler     Ventolin  (90 Base) MCG/ACT inhaler     magnesium oxide (MAG-OX) 400 MG tablet Take 1 tablet by mouth Daily. (Patient not taking: Reported on 5/16/2025)    traZODone (DESYREL) 100 MG tablet  (Patient not taking: Reported on 4/28/2023)     No current facility-administered medications on file prior to visit.         ROS:  ROS negative except as listed in the HPI.    Physical Exam:   64 y.o. male  Body mass index is 19.21 kg/m²., 71.6 kg (157 lb 12.8 oz)  Vitals:    05/16/25 1531   Temp: 98.2 °F (36.8 °C)     General: Alert, cooperative, appears well and in no observable distress. Appears stated age and BMI as listed above.  HEENT: Normocephalic, atraumatic on external visual inspection.  CV: No significant peripheral edema.  Respiratory: Normal respiratory effort.  Skin: Warm & well perfused; appropriate skin turgor.  Psych: Appropriate mood & affect.  Neuro: Gross sensation and motor intact in affected extremity/extremities.  Vascular: Peripheral pulses palpable in affected extremity/extremities.   Physical Exam      MSK Exam:  Left      Left shoulder palpation is normal.      Crepitus: mild      Tenderness: present        Posterior shoulder: moderate     Range of Motion    Right      Right shoulder range of motion is normal.     Left      Active ROM: pain.       Passive ROM: pain.       Active forward elevation: 170.       Passive forward elevation: 170.       Active external rotation at side: 40.       Passive external rotation at side: 40.       Internal rotation: lumbar.      Strength    Right       Right shoulder strength is normal.     Left      External rotation: 5/5. External rotation is not affected by pain.       Internal rotation: 5/5. Internal rotation is not affected by pain.       Abduction: 4+/5. Abduction is affected by pain.      Neurovascular    Left      Left shoulder nerve sensation is normal.     Special Tests    Left     Rotator Cuff Signs      Supraspinatus: negative       Painful arc test: negative     Special tests additional comments: Pain at terminal motion     No obvious pain with brief exam of cervical spine.     Radiology:    No new images were needed at the visit today.     2/11/2025: Shoulder: AP, Scapular Y and Axillary Lateral of left shoulder(s) show degenerative changes of the glenohumeral joint, moderate but with reasonable remaining joint space. He has some moderate AC joint changes. Otherwise, no obvious acute or chronic bony pathology. There are no prior films available for direct comparison.   Results      Procedure:   See Procedure Note: The potential risks and benefits of performing a diagnostic and therapeutic injection were discussed with the patient prior to procedure. Risks include, but are not limited to infection, swelling, transient increase in pain, bleeding, bruising. Patient was advised that injections are a diagnostic and therapeutic tool meaning they may not alleviate symptoms at all, or may only provide partial or temporary relief. Injection precautions and aftercare discussed.      [facility] [place]. Data/Labs: N/A    Assessment & Plan:      ICD-10-CM ICD-9-CM   1. Chronic left shoulder pain  M25.512 719.41    G89.29 338.29   2. Glenohumeral arthritis, left  M19.012 716.91     No orders of the defined types were placed in this encounter.    Orders Placed This Encounter   Procedures    Large Joint Arthrocentesis: L glenohumeral     This is a 64-year-old male with recurrent left shoulder pain.  He did get good relief with an injection which lasted almost 3 months.  I  still think more this is degenerative and it would be reasonable to do another injection.  Should he fail to get improvement or this situation continues to recur we should consider an MRI.  Plan is to proceed with injection today he will follow-up as needed and will notify us if he does not get good improvement with symptoms.  Assessment & Plan      Continue with activity modifications as needed/discussed.  Continue ICE and/or HEAT PRN.  Recommend to continue activity as tolerated, focus on quad and hip/core strengthening as well as gentle stretching.  Recommend lowering or maintaining BMI within a healthy range to reduce symptoms.   Patient encouraged to call with questions or concerns prior to follow up.  Will discuss with attending as needed.   Consider additional referrals, work up and/or advanced imaging as indicated or if patient fails to respond to conservative care.    Return if symptoms worsen or fail to improve.            NATHANIEL Wayne    Dictation software was used to complete a portion or all of this note.    Large Joint Arthrocentesis: L glenohumeral  Date/Time: 5/16/2025 3:29 PM  Consent given by: patient  Site marked: site marked  Timeout: Immediately prior to procedure a time out was called to verify the correct patient, procedure, equipment, support staff and site/side marked as required   Supporting Documentation  Indications: pain   Procedure Details  Location: shoulder - L glenohumeral  Preparation: Patient was prepped and draped in the usual sterile fashion  Needle gauge: 21G.  Approach: posterior  Medications administered: 2 mL lidocaine PF 1% 1 %; 80 mg methylPREDNISolone acetate 40 MG/ML  Patient tolerance: patient tolerated the procedure well with no immediate complications